# Patient Record
Sex: FEMALE | Race: WHITE | Employment: FULL TIME | URBAN - METROPOLITAN AREA
[De-identification: names, ages, dates, MRNs, and addresses within clinical notes are randomized per-mention and may not be internally consistent; named-entity substitution may affect disease eponyms.]

---

## 2022-11-26 ENCOUNTER — HOSPITAL ENCOUNTER (EMERGENCY)
Facility: CLINIC | Age: 42
End: 2022-11-26
Payer: COMMERCIAL

## 2023-01-14 ENCOUNTER — HEALTH MAINTENANCE LETTER (OUTPATIENT)
Age: 43
End: 2023-01-14

## 2024-02-17 ENCOUNTER — HEALTH MAINTENANCE LETTER (OUTPATIENT)
Age: 44
End: 2024-02-17

## 2024-06-06 ENCOUNTER — APPOINTMENT (OUTPATIENT)
Dept: GENERAL RADIOLOGY | Facility: CLINIC | Age: 44
End: 2024-06-06
Attending: STUDENT IN AN ORGANIZED HEALTH CARE EDUCATION/TRAINING PROGRAM
Payer: COMMERCIAL

## 2024-06-06 ENCOUNTER — HOSPITAL ENCOUNTER (EMERGENCY)
Facility: CLINIC | Age: 44
Discharge: HOME | End: 2024-06-07
Attending: STUDENT IN AN ORGANIZED HEALTH CARE EDUCATION/TRAINING PROGRAM | Admitting: STUDENT IN AN ORGANIZED HEALTH CARE EDUCATION/TRAINING PROGRAM
Payer: COMMERCIAL

## 2024-06-06 VITALS
DIASTOLIC BLOOD PRESSURE: 83 MMHG | RESPIRATION RATE: 16 BRPM | OXYGEN SATURATION: 100 % | TEMPERATURE: 98.1 F | WEIGHT: 195.55 LBS | HEART RATE: 77 BPM | SYSTOLIC BLOOD PRESSURE: 130 MMHG | BODY MASS INDEX: 33.38 KG/M2 | HEIGHT: 64 IN
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
## 2024-06-06 DIAGNOSIS — S61.210A LACERATION OF RIGHT INDEX FINGER WITHOUT FOREIGN BODY WITHOUT DAMAGE TO NAIL, INITIAL ENCOUNTER: ICD-10-CM

## 2024-06-06 DIAGNOSIS — S61.212A LACERATION OF RIGHT MIDDLE FINGER WITHOUT FOREIGN BODY WITHOUT DAMAGE TO NAIL, INITIAL ENCOUNTER: ICD-10-CM

## 2024-06-06 PROCEDURE — 99284 EMERGENCY DEPT VISIT MOD MDM: CPT

## 2024-06-06 PROCEDURE — 73130 X-RAY EXAM OF HAND: CPT | Mod: RT

## 2024-06-06 PROCEDURE — 12002 RPR S/N/AX/GEN/TRNK2.6-7.5CM: CPT

## 2024-06-06 PROCEDURE — 250N000013 HC RX MED GY IP 250 OP 250 PS 637: Performed by: STUDENT IN AN ORGANIZED HEALTH CARE EDUCATION/TRAINING PROGRAM

## 2024-06-06 RX ORDER — IBUPROFEN 600 MG/1
600 TABLET, FILM COATED ORAL ONCE
Status: COMPLETED | OUTPATIENT
Start: 2024-06-06 | End: 2024-06-06

## 2024-06-06 RX ORDER — CEPHALEXIN 500 MG/1
500 CAPSULE ORAL ONCE
Status: COMPLETED | OUTPATIENT
Start: 2024-06-06 | End: 2024-06-07

## 2024-06-06 RX ADMIN — IBUPROFEN 600 MG: 600 TABLET, FILM COATED ORAL at 21:42

## 2024-06-06 ASSESSMENT — ACTIVITIES OF DAILY LIVING (ADL)
ADLS_ACUITY_SCORE: 35

## 2024-06-06 ASSESSMENT — COLUMBIA-SUICIDE SEVERITY RATING SCALE - C-SSRS
2. HAVE YOU ACTUALLY HAD ANY THOUGHTS OF KILLING YOURSELF IN THE PAST MONTH?: NO
6. HAVE YOU EVER DONE ANYTHING, STARTED TO DO ANYTHING, OR PREPARED TO DO ANYTHING TO END YOUR LIFE?: NO
1. IN THE PAST MONTH, HAVE YOU WISHED YOU WERE DEAD OR WISHED YOU COULD GO TO SLEEP AND NOT WAKE UP?: NO

## 2024-06-07 PROCEDURE — 250N000013 HC RX MED GY IP 250 OP 250 PS 637: Performed by: STUDENT IN AN ORGANIZED HEALTH CARE EDUCATION/TRAINING PROGRAM

## 2024-06-07 RX ORDER — CEPHALEXIN 500 MG/1
500 CAPSULE ORAL 4 TIMES DAILY
Qty: 28 CAPSULE | Refills: 0 | Status: SHIPPED | OUTPATIENT
Start: 2024-06-07 | End: 2024-06-14

## 2024-06-07 RX ADMIN — CEPHALEXIN 500 MG: 500 CAPSULE ORAL at 00:54

## 2024-06-07 ASSESSMENT — ACTIVITIES OF DAILY LIVING (ADL): ADLS_ACUITY_SCORE: 35

## 2024-06-07 NOTE — ED PROVIDER NOTES
"  Emergency Department Note      History of Present Illness     Chief Complaint  Hand Pain    HPI  Letyt Fleming is a 44 year old right-hand dominant female who presents to the ED with her  for evaluation of hand pain. The patient reports that she was playing soccer at 2000 when she tripped on the soccer ball and tripped forward, bending her right fingers backward attempting to catch herself. As result of the landing she suffered a breach of the skin in these fingers and presents with open lacerations to the index and middle finger. She notes that her fingers are swelling and becoming sore, however she denies numbness or tingling. The patient denies hitting her head or losing consciousness. She notes that she did slightly hurt her right knee and presents with a small abrasion on the left knee. The patient denies a history of diabetes, IV drug use, HIV, or personal history of cancer.    Independent Historian  None    Review of External Notes  [facility]-last tetanus 2023  Past Medical History   Medical History and Problem List  Obesity     Medications  None on file    Surgical History   No past surgical history on file.    Physical Exam   Patient Vitals for the past 24 hrs:   BP Temp Temp src Pulse Resp SpO2 Height Weight   06/06/24 2142 130/83 -- -- 77 16 100 % -- --   06/06/24 2030 131/79 98.1  F (36.7  C) Oral 64 18 100 % 1.626 m (5' 4\") 88.7 kg (195 lb 8.8 oz)     Physical Exam  Vital signs and nursing notes reviewed.    General:  Alert and oriented, no acute distress. Resting on bed with  at bedside.   Skin: Skin is warm and dry.  Small abrasion to left anterior knee  HEENT:   Head: Normocephalic, atraumatic. Facial features symmetric.   Eyes: Conjunctiva pink, sclera white. EOMs grossly intact.   Ears: Auricles without lesion, erythema, or edema.   Nose: Symmetric with no discharge.  Mouth and throat: Lips are moist with no lesions or edema  Neck: Normal range of motion.   CV:  Heart RRR with no " murmurs or extra heart sounds. 2+ radial and tibialis posterior pulses bilaterally. No peripheral edema.  Pulm/Chest: Chest wall expansion symmetric with no increased effort of breathing. Lungs clear and equal to auscultation bilaterally.   M/S:  Left hand: Normal shoulder, upper arm, elbow, forearm.  No tenderness to palpation or deformity of the wrist.  No snuffbox tenderness.  Hand and fingers are without tenderness palpation with the exception of tenderness to palpation over the proximal phalanx of the middle finger and tenderness to palpation of the PIP joint of the index and middle finger  There are two 3 cm lacerations to volar aspect of PIP joint of the index and middle finger.  Visualized tendon beneath both skin lacerations.  No visualized laceration of the tendons when assessed through full range of motion in a bloodless field.  Patient has normal flexion and extension against strength at the PIP and DIP of her index and middle fingers.  2-point sensation is intact distally to both digits.  Psych: Normal mood and affect. Behavior is normal.     Diagnostics   Lab Results   Labs Ordered and Resulted from Time of ED Arrival to Time of ED Departure - No data to display    ED Course    Medications Administered  Medications   ibuprofen (ADVIL/MOTRIN) tablet 600 mg (600 mg Oral $Given 6/6/24 0077)   cephALEXin (KEFLEX) capsule 500 mg (500 mg Oral $Given 6/7/24 0054)     Procedures  Procedures     Laceration Repair      Procedure: Laceration Repair    Indication: Laceration    Consent: Verbal    Tetanus status reviewed    Location: Right R second (index) finger    Length: 3 cm    Preparation: Irrigation with Sterile Saline.    Anesthesia/Sedation: Bupivacaine - 0.5%      Treatment/Exploration: Wound explored, no foreign bodies found     Closure: The wound was closed with one layer. Skin/superficial layer was closed with 6 x 4-0 Nylon using Interrupted sutures.     Patient Status: The patient tolerated the  procedure well: Yes. There were no complications.        Laceration Repair      Procedure: Laceration Repair    Indication: Laceration    Consent: Verbal    Tetanus status reviewed    Location: Right R third (middle) finger    Length: 2.5 cm    Preparation: Irrigation with Sterile Saline.    Anesthesia/Sedation: Bupivacaine - 0.5%      Treatment/Exploration: Wound explored, no foreign bodies found     Closure: The wound was closed with one layer. Skin/superficial layer was closed with 5 x 4-0 Nylon using Interrupted sutures.     Patient Status: The patient tolerated the procedure well: Yes. There were no complications.      Discussion of Management  None    Social Determinants of Health adding to complexity of care  None    ED Course  ED Course as of 06/07/24 0106   Thu Jun 06, 2024 2202 I reassessed the patient and updated them on results and plan of care.      2254 I performed the laceration repair procedure as noted above.   Fri Jun 07, 2024   0057 I spoke with Dr. Rollins, orthopedics, regarding patient's presentation, findings, and plan of care.  No indication for IV antibiotics.  Recommended follow-up in clinic on Monday.       Medical Decision Making / Diagnosis   CMS Diagnoses: None    [facility]  Letty Fleming is a 44 year old female who presents to the ED with injury to her right hand.  See HPI.  On exam, she is laceration overlying the volar aspect of her PIP joint of her index and middle finger.  This was sustained from a hyperextension injury as outlined above.  Vital signs are reassuring.  Patient has minimal bony tenderness to palpation and x-ray reveals no dislocation.  There is bony fragment appreciated of the index finger indicating possible acute fracture.  Fortunately, patient is entirely neurovascularly intact with 2-point sensation intact to both fingers distal to the injury, brisk capillary refill of the digit tips.  Additionally, her muscular function is intact with normal flexion and  extension at the PIP and DIP against strength.  I am able to see tendon on exam, but there is no visualized tendon laceration when assessed through full range of motion in a bloodless field.  The wounds were irrigated copiously with sterile saline.  They were anesthetized and repaired as outlined above using simple interrupted sutures.  She will be started on Keflex and received her first dose of antibiotics here in the ED.  I spoke to hand Ortho on-call regarding the patient's case given questionable open fracture.  Dr. Rollins does not feel there is any indication for IV antibiotics, hospitalization, or emergent orthopedic consult tonight.  Agrees with discharge home and clinic follow-up in the coming days.  In the meantime, patient is instructed to return to the ED should she develop fevers or chills, redness streaking on her hand, white drainage from the wounds, or any further emergent concerns.  The wounds were dressed, index finger was placed in AlumaFoam splint that was then buddy taped to her middle finger.  Patient and  are agreeable to plan and had questions answered.  Tetanus is up-to-date.    Disposition  The patient was discharged.     ICD-10 Codes:    ICD-10-CM    1. Laceration of right index finger without foreign body without damage to nail, initial encounter  S61.210A     Possible underlying fracture      2. Laceration of right middle finger without foreign body without damage to nail, initial encounter  S61.212A       3. Fall from slip, trip, or stumble, initial encounter  W01.0XXA            Discharge Medications  Discharge Medication List as of 6/7/2024 12:55 AM        START taking these medications    Details   cephALEXin (KEFLEX) 500 MG capsule Take 1 capsule (500 mg) by mouth 4 times daily for 7 days, Disp-28 capsule, R-0, E-Prescribe             Scribe Disclosure:  Wilma VO, am serving as a scribe at 8:37 PM on 6/6/2024 to document services personally performed by Natalia  Vandana HOWE PA-C based on my observations and the provider's statements to me.       Vandana Fisher PA-C on 6/7/2024 at 1:16 AM       Vandana Fisher PA-C  06/07/24 0116

## 2024-06-07 NOTE — DISCHARGE INSTRUCTIONS
Leave bandage in place for 24 hours  After this you may gently clean the wound once daily with soap/water  Wear splint until cleared by orthopedics, to help prevent excessive joint bending and to aid in healing  Return to the ED should you develop redness around the wound, streaking down the hand, fevers, chills, wound drainage, or any further concerns.  Follow up with hand ortho ASAP for definitive management  Hope you feel better soon!  Discharge Instructions  Laceration (Cut)    You were seen today for a laceration (cut).  Your provider examined your laceration for any problems such a buried foreign body (like glass, a splinter, or gravel), or injury to blood vessels, tendons, and nerves.  Your provider may have also rinsed and/or scrubbed your laceration to help prevent an infection. It may not be possible to find all problems with your laceration on the first visit; occasionally foreign bodies or a tendon injury can go undetected.    Your laceration may have been closed in one of several ways:  No closure: many wounds will heal just fine without closure.  Stitches: regular stitches that require removal.  Staples: skin staples are often used in the scalp/head.  Wound adhesive (glue): skin glue can be used for certain lacerations and doesn t require removal.  Wound strips (aka Butterfly bandages or steri-strips): these are bandages that help to close a wound.  Absorbable stitches:  dissolving  stitches that go away on their own and usually don t require removal.    A small percentage of wounds will develop an infection regardless of how well the wound is cared for. Antibiotics are generally not indicated to prevent an infection so are only given for a small number of high-risk wounds. Some lacerations are too high risk to close, and are left open to heal because closure can increase the likelihood that an infection will develop.    Remember that all lacerations, no matter how expertly repaired, will cause  scarring. We consider many factors, techniques, and materials, in our efforts to provide the best possible cosmetic outcome.    Generally, every Emergency Department visit should have a follow-up clinic visit with either a primary or a specialty clinic/provider. Please follow-up as instructed by your emergency provider today.     Return to the Emergency Department right away if:  You have more redness, swelling, pain, drainage (pus), a bad smell, or red streaking from your laceration as these symptoms could indicate an infection.  You have a fever of 100.4 F or more.  You have bleeding that you cannot stop at home. If your cut starts to bleed, hold pressure on the bleeding area with a clean cloth or put pressure over the bandage.  If the bleeding does not stop after using constant pressure for 30 minutes, you should return to the Emergency Department for further treatment.  An area past the laceration is cool, pale, or blue compared with the other side, or has a slower return of color when squeezed.  Your dressing seems too tight or starts to get uncomfortable or painful. For children, signs of a problem might be irritability or restlessness.  You have loss of normal function or use of an area, such as being unable to straighten or bend a finger normally.  You have a numb area past the laceration.    Return to the Emergency Department or see your regular provider if:  The laceration starts to come open.   You have something coming out of the cut or a feeling that there is something in the laceration.  Your wound will not heal, or keeps breaking open. There can always be glass, wood, dirt or other things in any wound.  They will not always show up, even on x-rays.  If a wound does not heal, this may be why, and it is important to follow-up with your regular provider.    Home Care:  Take your dressing off in 12-24 hours, or as instructed by your provider, to check your laceration. Remove the dressing sooner if it  seems too tight or painful, or if it is getting numb, tingly, or pale past the dressing.  Gently wash your laceration 1-2 times daily with clean water and mild soap. It is okay to shower or run clean water over the laceration, but do not let the laceration soak in water (no swimming).  If your laceration was closed with wound adhesive or strips: pat it dry and leave it open to the air. For all other repairs: after you wash your laceration, or at least 2 times a day, apply antibiotic ointment (such as Neosporin  or Bacitracin ) to the laceration, then cover it with a Band-Aid  or gauze.  Keep the laceration clean. Wear gloves or other protective clothing if you are around dirt.    Follow-up for removal:  If your wound was closed with staples or regular stitches, they need to be removed according to the instructions and timeline specified by your provider today.  If your wound was closed with absorbable ( dissolving ) sutures, they should fall out, dissolve, or not be visible in about one week. If they are still visible, then they should be removed according to the instructions and timeline specified by your provider today.    Scars:  To help minimize scarring:  Wear sunscreen over the healed laceration when out in the sun.  Massage the area regularly once healed.  You may apply Vitamin E to the healed wound.  Wait. Scars improve in appearance over months and years.    If you were given a prescription for medicine here today, be sure to read all of the information (including the package insert) that comes with your prescription.  This will include important information about the medicine, its side effects, and any warnings that you need to know about.  The pharmacist who fills the prescription can provide more information and answer questions you may have about the medicine.  If you have questions or concerns that the pharmacist cannot address, please call or return to the Emergency Department.       Remember that you  can always come back to the Emergency Department if you are not able to see your regular provider in the amount of time listed above, if you get any new symptoms, or if there is anything that worries you.

## 2024-06-07 NOTE — ED TRIAGE NOTES
Patient was playing soccer, and stepped on the ball which caused her to fall forward patient put her hand out to catch herself and bent her fingers back patient has lacerations to both her index and middle finger.       Triage Assessment (Adult)       Row Name 06/06/24 2029          Triage Assessment    Airway WDL WDL        Respiratory WDL    Respiratory WDL WDL        Skin Circulation/Temperature WDL    Skin Circulation/Temperature WDL WDL        Cardiac WDL    Cardiac WDL WDL        Peripheral/Neurovascular WDL    Peripheral Neurovascular WDL WDL

## 2024-06-07 NOTE — ED NOTES
Emergency Department Technician Wound Irrigation Note:    6/6/2024    10:26 PM      Wound location:  R hand; middle and index finger    Irrigation Fluid: Normal Saline    Estimated Irrigation Volume (60 mL fluid per cm): 1700    Emma Duran

## 2025-06-01 ENCOUNTER — HEALTH MAINTENANCE LETTER (OUTPATIENT)
Age: 45
End: 2025-06-01

## (undated) RX ORDER — BUPIVACAINE HYDROCHLORIDE 5 MG/ML
INJECTION, SOLUTION EPIDURAL; INTRACAUDAL
Status: DISPENSED
Start: 2024-06-06

## (undated) RX ORDER — IBUPROFEN 600 MG/1
TABLET, FILM COATED ORAL
Status: DISPENSED
Start: 2024-06-06